# Patient Record
(demographics unavailable — no encounter records)

---

## 2025-03-11 NOTE — DVH
EXAM: US OB ULTRASOUND COMP LESS 14WKS



CLINICAL HISTORY: VAGINAL BLEEDING



COMPARISON: None



TECHNIQUE: Grayscale, color-flow Doppler, and spectral Doppler ultrasound of the pelvis is performed 
by  transabdominal and transvaginal technique.



Findings:



Single  intrauterine pregnancy with gestational and yolk sac visualized. Irregular, septated anechoic
 lesion in the gestational sac.



No embryo appreciated. Estimated gestational age 7 weeks 1 day based on fetal parameters including ge
stational sac size of 2.5 cm.



Uterus measures 8.7 x 6.5 x 5.8 cm in size. Cervical os appears closed. 



Moderate subchorionic hemorrhage.



Right ovary measures 1.6 x 1.3 x 2.2 cm. Left ovary measures 2.6 x 2.4 x 1.9 cm. 1.3 x 1.0 x 1.3 cm c
omplex structure in the left ovary. Normal ovarian color Doppler flow bilaterally.



No  free fluid within the cul-de-sac.



Impression: 



1. Single intrauterine pregnancy without evidence of an embryo with estimated gestational age of 7 we
eks 1 day. Findings are suspicious for a failed pregnancy. Recommend a short interval follow up ultra
sound and serial quantitative beta HCG for further evaluation.



 



2. Moderate subchorionic hemorrhage. Cervical os appears closed.



 



3. Righth ovary is grossly unremarkable. Left ovarian hemorrhagic cyst.



 



Electronically Signed by: Becky Das at 03/11/2025 18:27:14 PM

## 2025-03-14 NOTE — DVH
OB ULTRASOUND <14 WEEKS: 



HISTORY: Vaginal bleed



TECHNIQUE:  Multiple real-time grayscale sonographic images of the pelvis with duplex Doppler color f
low, spectral and M-mode analysis. 



TRANSDUCERS: 



FINDINGS:



Uterus is anteverted there is a gestational sac uterus measures 9.05 x 8.55 x 5. cm. Right ovary myron
ures 1.66 by 0.86 x 1.95 cm left ovary measures 2.3 x 1.64 x 1.61 cm.



Gestational sac mean sac diameter is 2.44 cm and there is a yolk sac no fetal pole is seen.



Gestational sac mean sac diameter is equivalent to 7 week 1-day-old gestation. There is no subchorion
ic hemorrhage



IMPRESSION: 



1. No fetal pole is identified. I would recommend follow-up study in 1 week to 10 days if beta hCGs a
re still consistent with a pregnancy 



Electronically Signed by: Mo Hoff at 03/14/2025 20:01:37 PM

## 2025-03-14 NOTE — ED.PDOC
OB/GYN


HPI Comments


39Y  F presents to ED for chief complaint vaginal bleeding x today with 

abd/pelvic cramping. Pt states she has had discharge and clots since Tuesday but

today began to actively spot. Pt states she is approximately 8 weeks pregnant 

with LMP 2025. VS stable. No other symptoms reported.Patient was seen this

facility a few days ago at which time she received a ultrasound and a beta-hCG 

quant.


Chief Complaint:  Vaginal Bleed


Time Seen by MD:  18:45


Reviewed Notes:  Nurses Notes, Medications, Allergies


Allergies:  


Coded Allergies:  


     No Known Drug Allergy (Verified  Allergy, Unknown, 20)


Home Meds


Active Scripts


Nitrofurantoin Monohydrate Mac (Macrobid) 100 Mg Cap, 100 MG PO BID for 5 Days, 

#10 CAP


   Prov:FIDELINA MENDOSA MD         3/11/25


Ciprofloxacin Hcl (Cipro) 500 Mg Tab, 1 TAB PO BID, #14 TAB


   Prov:FIDELINA MENDOSA MD         3/11/25


Methocarbamol (Methocarbamol) 750 Mg Tab, 750 MG PO BID, #20 TAB


   Prov:JUANCHO WALLS         3/28/24


Acetaminophen (Tylenol 8 Hour Arthritis) 650 Mg Tab, 650 MG PO TID, #30 TAB


   Prov:JUANCHO WALLS         3/28/24


Cephalexin ( Keflex 500) 500 Mg Cap, 1 CAP PO QID for 7 Days, #28 CAP


   Prov:MANUELA LUCIANO MD         22


Reported Medications


Prenatal Vit W/ Ferrous Fumara (Prenatal One Daily) Daily Tab, 1 TAB PO DAILY, 

#90 TAB 3 Refills


   20


Information Source:  Patient


Mode of Arrival:  Ambulatory


Timing:  Days


Prehospital treatment:  None


Severity:  Mild


Vaginal Discharge:  Other


Vaginal Lesions:  None


Bleeding Quality:  Clotted


Vaginal Mass:  None


Onset Of Mass/Bleeding:  Spontaneous


Sexual Activity:  Pregnant


Last Consensual [place]:  Unknown


Birth Control:  None


History of:  Current Pregnancy


Blood Type:  Unknown


Associated Signs and Symptoms:  Vaginal Discharge, Vaginal Bleeding, Cramping





Past Medical History


PAST MEDICAL HISTORY:  Denies


Surgical History:  Denies all surgeries


GYN History:  Denies all GYN Hx





Family History


Family History:  Reviewed,noncontributory to illness





Social History


Smoker:  Non-Smoker


Alcohol:  Denies ETOH Use


Drugs:  Denies Drug Use


Lives In:  Home





Constitutional:  denies: chills, diaphoresis, fatigue, fever, malaise, sweats, 

weakness, others


EENTM:  denies: blurred vision, double vision, ear bleeding, ear discharge, ear 

drainage, ear pain, ear ringing, eye pain, eye redness, hearing loss, mouth 

pain, mouth swelling, nasal discharge, nose bleeding, nose congestion, nose 

pain, photophobia, tearing, throat pain, throat swelling, voice changes, others


Respiratory:  denies: cough, hemoptysis, orthopnea, SOB at rest, shortness of 

breath, SOB with excertion, stridor, wheezing, others


Cardiovascular:  denies: chest pain, dizzy spells, diaphoresis, Dyspnea on 

exertion, edema, irregular heart beat, left arm pain, lightheadedness, 

palpitations, PND, syncope, others


Gastrointestinal:  denies: abdomen distended, abdominal pain, blood streaked 

bowels, constipated, diarrhea, dysphagia, difficulty swallowing, hematemesis, 

melena, nausea, poor appetite, poor fluid intake, rectal bleeding, rectal pain, 

vomiting, others


Genitourinary:  reports: abnormal vagina bleeding, pain, pregnant, vagina 

discharge; denies: burning, dyspareunia, dysuria, flank pain, frequency, 

hematuria, incontinence, urgency, others


Neurological:  denies: dizziness, fainting, headache, left sided numbness, left 

sided weakness, numbness, paresthesia, pre-existing deficit, right sided 

numbness, right sided weakness, seizure, speech problems, tingling, tremors, 

weakness, others


Musculoskeletal:  denies: back pain, gout, joint pain, joint swelling, muscle 

pain, muscle stiffness, neck pain, others


Integumetry:  denies: bruises, change in color, change in hair/nails, dryness, 

laceration, lesions, lumps, rash, wounds, others


Allergic/Immunocompromised:  denies: Difficulty Healing, Frequent Infections, 

Hives, Itching, others


Hematologic/Lymphatic:  denies: anemia, blood clots, easy bleeding, easy 

bruising, swollen glands, others


Endocrine:  denies: excessive hunger, excessive sweating, excessive thirst, 

excessive urination, flushing, intolerance to cold, intolerance to heat, 

unexplained weight gain, unexplained weight loss, others


Psychiatric:  denies: anxiety, bipolar disorder, depression, hopeless, panic 

disorder, schizophrenia, sleepless, suicidal, others


All Other Systems:  Reviewed and Negative





Physical Exam


General Appearance:  Mild Distress (Due to some   Abdominal cramping concerns), 

Normal


HEENT:  Normal ENT Inspection, Pharynx Normal, TMs Normal


Neck:  Full Range of Motion, Non-Tender, Normal, Normal Inspection


Respiratory:  Chest Non-Tender, Lungs Clear, No Accessory Muscle Use, No 

Respiratory Distress, Normal Breath Sounds


Cardiovascular:  No Edema, No JVD, No Murmur, No Gallop, Normal Peripheral 

Pulses, Regular Rate/Rhythm


Breast Exam:  Deferred


Gastrointestinal:  Soft, Other ( diffuse bilateral lower abdominal tenderness to

palpation extending into the pelvis.  No pulsatile masses.  Pregnancy not 

appreciated.)


Genitalia:  Deferred


Pelvic:  Deferred


Rectal:  Deferred


Extremities:  No calf tenderness, Normal capillary refill, Normal inspection, 

Normal range of motion, Non-tender, No pedal edema


Musculoskeletal :  


   Apperance:  Normal


Neurologic:  Alert, No Motor Deficits, Normal Affect, Normal Mood, No Sensory 

Deficits


Cerebellar Function:  Normal


Reflexes:  Normal


Skin:  Dry, Normal Color, Warm


Lymphatic:  No Adenopathy





Was a procedure done?


Was a procedure done?:  No





Differential Diagnosis (GYN)


Vaginal Bleeding:   - Complete,  - Incomplete,  - 

Inevitable,  - Missed,  - Threatened, Ectopic Pregnancy, 

Placenta Previa





X-Ray, Labs, Meds, VS





                                   Vital Signs








  Date Time  Temp Pulse Resp B/P (MAP) Pulse Ox O2 Delivery O2 Flow Rate FiO2


 


3/14/25 21:05  74 18  99 Room Air  


 


3/14/25 21:05 98.1 74 18 131/51 (77) 99   





 98.1       


 


3/14/25 18:33 97.8 82 16 122/42 (68) 99   





 97.8       








                                       Lab








Test


 3/14/25


18:30 3/14/25


18:15 Range/Units


 


 


White Blood Count


 10.1 


 


 4.4-10.8


10^3/uL


 


Red Blood Count


 4.36 


 


 4.0-5.20


10^6/uL


 


Hemoglobin 13.0   12.2-16.2  g/dL


 


Hematocrit 38.9   36.0-46.0  %


 


Mean Corpuscular Volume 89.3   80.0-100.0  fL


 


Mean Corpuscular Hemoglobin 29.7   28.0-32.0  pg


 


Mean Corpuscular Hemoglobin


Concent 33.3 


 


 32.0-36.0  g/dL





 


Red Cell Distribution Width 14.3   11.8-14.3  %


 


Platelet Count


 409 


 


 140-450


10^3/uL


 


Mean Platelet Volume 7.4   6.9-10.8  fL


 


Neutrophils (%) (Auto) 59.1   37.0-80.0  %


 


Lymphocytes (%) (Auto) 33.6   10.0-50.0  %


 


Monocytes (%) (Auto) 3.9   0.0-12.0  %


 


Eosinophils (%) (Auto) 2.8   0.0-7.0  %


 


Basophils (%) (Auto) 0.6   0.0-2.0  %


 


Neutrophils # (Auto)


 5.9 


 


 1.6-8.6  10


^3/uL


 


Lymphocytes # (Auto)


 3.4 


 


 0.4-5.4  10


^3/uL


 


Monocytes # (Auto) 0.4   0-1.3  10 ^3/uL


 


Eosinophils # (Auto) 0.3   0-0.8  10 ^3/uL


 


Basophils # (Auto) 0.1   0-0.2  10 ^3/uL


 


Nucleated Red Blood Cells 0.0    %


 


Beta HCG, Quantitative 5041.6 H  1.5-4.2  mIU/mL


 


Urine Color  Light-yellow  Yellow  


 


Urine Clarity  Clear  Clear  


 


Urine pH  6.0  5.0-9.0  


 


Urine Specific Gravity  1.022  1.001-1.035  


 


Urine Protein  Negative  Negative  


 


Urine Ketones  Negative  Negative  


 


Urine Blood  Negative  Negative  /uL


 


Urine Nitrite  Negative  Negative  


 


Urine Bilirubin  Negative  Negative  


 


Urine Urobilinogen  Normal  Negative  mg/dL


 


Urine Leukocyte Esterase  Negative  Negative  /uL


 


Urine RBC  3  0 - 4  /hpf


 


Urine Microscopic WBC  1  0-5  /HPF


 


Urine Squamous Epithelial


Cells 


 Few 


 <5  /hpf





 


Urine Bacteria  None seen  None Seen  /hpf


 


Urine Mucus  Few  None Seen  


 


Urine Glucose  Normal  Normal  mg/dL





[place] [address]


                              Ph: [phone]





                               DIAGNOSTIC IMAGING


                      Diagnostic Imaging Report : [id number]


                                     Signed








PATIENT: CANELO MEYERS   ACCT: [id number]        UNIT: [id number]


: 1985           LOC: ER                   ROOM / BED:  / 


AGE / SEX: 39 / F         ADM STATUS: REG ER        SERVICE DT: [id number]





ORDERING PHYSICIAN: OUSMANE ESPANA PAC


PROCEDURE(s): OB4US - OB ULTRASOUND COMP LESS 14WKS


REASON: Vaginal bleed


ORDER NUMBER(s): [id number], ACCESSION NUMBER(s): [id number]








OB ULTRASOUND <14 WEEKS: 





HISTORY: Vaginal bleed





TECHNIQUE:  Multiple real-time grayscale sonographic images of the pelvis with 

duplex Doppler color flow, spectral and M-mode analysis. 





TRANSDUCERS: 





FINDINGS:





Uterus is anteverted there is a gestational sac uterus measures 9.05 x 8.55 x 5.

cm. Right ovary measures 1.66 by 0.86 x 1.95 cm left ovary measures 2.3 x 1.64 x

1.61 cm.





Gestational sac mean sac diameter is 2.44 cm and there is a yolk sac no fetal 

pole is seen.





Gestational sac mean sac diameter is equivalent to 7 week 1-day-old gestation. 

There is no subchorionic hemorrhage





IMPRESSION: 





1. No fetal pole is identified. I would recommend follow-up study in 1 week to 

10 days if beta hCGs are still consistent with a pregnancy 





Electronically Signed by: Mo Gaitan at 2025 20:01:37 PM











DICTATED BY: MO GAITAN MD


DICTATED DATE/TIME: 25





SIGNED BY: MO GAITAN MD


SIGNED DATE/TIME: 25





CC: 


X-Ray, Labs, Meds, VS Comment


All studies performed the ED were evaluated by me personally.  Patient's beta-

hCG was slightly above 5000 which is a reduction from the 7000 value seen a few 

days ago.  Transvaginal ultrasound revealed a fetal pole only without any viable

fetal formation.  Patient has had a miscarriage.  Advised patient to follow up 

with her OB gyn for continued evaluation and confirmation of completion of 

passing products of conception.


Time of 1ST Reevaluation:  23:15


Reevaluation 1ST:  Unchanged


Consultation:  PCP, OB/Gyn


Patient Education/Counseling:  Diagnosis, Treatment


Family Education/Counseling:  Diagnosis, Treatment, No Family Present





Departure 1


Departure


Time of Disposition:  23:16


Impression:  


   Primary Impression:  


   Miscarriage


Disposition:   HOME / SELF CARE / HOMELESS


Condition:  Stable





Additional Instructions:  


Advised patient follow up with the OB gyn on the  as scheduled to evaluate 

any retained products with the consumption concerns.  Advised Tylenol and or 

Motrin as needed for pain relief.


e-Prescriptions


Acetaminophen (Acetaminophen) 500 Mg Tab


500 MG PO Q4HP PRN, #30 TAB


   Prov: OUSMANE ESPANA PAC         3/14/25


Discharged With:  Self, Friend





Critical Care Note


Critical Care Time?:  No





Stability


Stability form required:  No





Heart Score


Heart Score:  








Heart Score Response (Comments) Value


 


History N/A 0


 


EKG N/A 0


 


Age N/A 0


 


Risk Factors N/A 0


 


Troponin N/A 0


 


Total  0














I personally scribed for OUSMANE ESPANA PAC ([email or web address]) on 3/14/25 at 19:41.  

Electronically submitted by Mariaelena Lynn ([employer]).


I personally scribed for OUSMANE ESPANA PAC ([email or web address]) on 3/14/25 at 20:14.  

Electronically submitted by Mariaelena Lynn ([employer]).





OUSMANE ESPANA PAC               Mar 14, 2025 19:41

## 2025-03-18 NOTE — DVH
OB ULTRASOUND <14 WEEKS: 



HISTORY: Vaginal bleeding



TECHNIQUE:  Multiple real-time grayscale sonographic images of the pelvis with duplex Doppler color f
low, spectral and M-mode analysis. 



TRANSDUCERS: Transabdominal



FINDINGS:



The uterus measures 11.8 x 5.1 x 4.8 cm.



The cervix not well visualized



Bilateral ovaries are not well visualized due to obscuration from bowel gas.



Endometrium appears thickened measuring 2.2 cm and heterogeneous.



IMPRESSION: 



Thickened endometrium, without visualized gestational sac, fetal pole or fetal cardiac activity. Diff
erential considerations include early, normal intrauterine pregnancy, an anembryonic pregnancy and sp
ontaneous . Recommend correlation with follow-up beta hCG levels. Repeat ultrasound could be 
performed if clinically indicated.



Electronically Signed by: Chanel Lowe at 2025 14:08:38 PM

## 2025-03-18 NOTE — DVHINCON2
Date of service:  Mar 18, 2025


Referring Physician


dr rodarte





Reason for Consultation


sab in progress





History of Present Illness


pt is being seen for heavy vag bleeding ,loosing preg .pt states she started 

bleeding 


last night passage of clots ,sono on today shows no iup,no endometrial thickness




c/w sab in progress





Past Medical History


none


Past Surgical History


none


Family History


na


Social History


n/a





Allergies:  


Coded Allergies:  


     NO KNOWN ALLERGIES (Unverified , 3/18/25)





Review of Systems


c/w hpi





Vital Signs





                                   Vital Signs








  Date Time  Temp Pulse Resp B/P (MAP) Pulse Ox O2 Delivery O2 Flow Rate FiO2


 


3/18/25 13:05 98.1 87 22 114/56 (75) 97   





 98.1       








Physical Exam


alert and responding approp


heent wnl 


cv-rrr


lungs cta


abd soft,nt 


pelvic-cx 1cm ,poc at cervical opening seen and evacuated after spec 


was placed no bleeding noted after removal of poc.





Labs/Diagnostic Data





                                      Labs








Test


 3/18/25


13:23 Range/Units


 


 


White Blood Count


 15.6 H


 4.4-10.8


10^3/uL


 


Red Blood Count


 3.26 L


 4.0-5.20


10^6/uL


 


Hemoglobin 9.8 L 12.2-16.2  g/dL


 


Hematocrit 29.1 L 36.0-46.0  %


 


Mean Corpuscular Volume 89.3  80.0-100.0  fL


 


Mean Corpuscular Hemoglobin 30.0  28.0-32.0  pg


 


Mean Corpuscular Hemoglobin


Concent 33.6 


 32.0-36.0  g/dL





 


Red Cell Distribution Width 14.1  11.8-14.3  %


 


Platelet Count


 347 


 140-450


10^3/uL


 


Mean Platelet Volume 7.1  6.9-10.8  fL


 


Neutrophils (%) (Auto) 86.3 H 37.0-80.0  %


 


Lymphocytes (%) (Auto) 9.6 L 10.0-50.0  %


 


Monocytes (%) (Auto) 3.2  0.0-12.0  %


 


Eosinophils (%) (Auto) 0.6  0.0-7.0  %


 


Basophils (%) (Auto) 0.3  0.0-2.0  %


 


Neutrophils # (Auto)


 13.5 H


 1.6-8.6  10


^3/uL


 


Lymphocytes # (Auto)


 1.5 


 0.4-5.4  10


^3/uL


 


Monocytes # (Auto) 0.5  0-1.3  10 ^3/uL


 


Eosinophils # (Auto) 0.1  0-0.8  10 ^3/uL


 


Basophils # (Auto) 0  0-0.2  10 ^3/uL


 


Nucleated Red Blood Cells 0.0   %


 


Prothrombin Time 10.6  9.3-11.8  sec


 


Prothrombin Time INR 1.00  0.9-1.15  


 


Activated Partial


Thromboplast Time 24.8 


 24.5-34.5  SEC














Primary Diagnosis


sab in progress appears to be compelet ab


Plan


bhcg pending,nurse instructed to cont with ivf plus pitocin,ancefx 2g given and 

morphine 


1mg given.pt will be dced home on cytotec 800mcg vag ,fu with me in one wk.burt

cath place dto drain 


bladder .case d/w dr rodarte and pt checked out to him


Plan discussed with:  Patient





Visit Coding OBGYN


Date of Service:  Mar 18, 2025


Billing Provider:  HUNTER HAIRSTON DO


OB/GYN Common Visit Codes:  99220-INITIAL OBS CARE (HIGH)


OB/GYN Consultation Codes:  06100-KDQHNHAOW CONSULT <40MIN


OB/GYN Procedure Codes:  55158-ZA OF INCOMP AB,ANY TRIMESTER











HUNTER HAIRSTON DO                 Mar 18, 2025 14:31
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
